# Patient Record
Sex: FEMALE | Race: WHITE | NOT HISPANIC OR LATINO | URBAN - METROPOLITAN AREA
[De-identification: names, ages, dates, MRNs, and addresses within clinical notes are randomized per-mention and may not be internally consistent; named-entity substitution may affect disease eponyms.]

---

## 2020-10-22 ENCOUNTER — OFFICE VISIT (OUTPATIENT)
Dept: URBAN - METROPOLITAN AREA CLINIC 109 | Facility: CLINIC | Age: 45
End: 2020-10-22
Payer: COMMERCIAL

## 2020-10-22 ENCOUNTER — DASHBOARD ENCOUNTERS (OUTPATIENT)
Age: 45
End: 2020-10-22

## 2020-10-22 DIAGNOSIS — K21.9 GASTROESOPHAGEAL REFLUX DISEASE, UNSPECIFIED WHETHER ESOPHAGITIS PRESENT: ICD-10-CM

## 2020-10-22 PROBLEM — 10743008: Status: ACTIVE | Noted: 2020-10-22

## 2020-10-22 PROCEDURE — 99203 OFFICE O/P NEW LOW 30 MIN: CPT | Performed by: INTERNAL MEDICINE

## 2020-10-22 NOTE — HPI-TODAY'S VISIT:
The patient has been referred for a recent bout of severe heartburn and reflux symptoms.  This occurred 2 or 3 weeks ago and is unexplained as far as the patient can discern regarding any changes in her diet, activities or any medications.  She takes no prescription medications but did take Pepcid AC for several days.  She denies any dysphagia, nausea, vomiting or abdominal pain.  The patient has experienced no weight loss, diarrhea, constipation or blood in the stool.   Family history is notable for a maternal grandmother in her 60s who had colon cancer and a cousin with colon cancer.  Patient had colonoscopy in 2012 with biopsies for microscopic colitis at that time which proved to be negative.  EGD with biopsies also in 2012 revealed no evidence of celiac disease.  She had a negative celiac panel at that time.